# Patient Record
Sex: MALE | Race: WHITE | NOT HISPANIC OR LATINO | Employment: OTHER | URBAN - METROPOLITAN AREA
[De-identification: names, ages, dates, MRNs, and addresses within clinical notes are randomized per-mention and may not be internally consistent; named-entity substitution may affect disease eponyms.]

---

## 2020-07-16 ENCOUNTER — TELEPHONE (OUTPATIENT)
Dept: ELECTROPHYSIOLOGY | Facility: CLINIC | Age: 75
End: 2020-07-16

## 2020-07-17 ENCOUNTER — TELEPHONE (OUTPATIENT)
Dept: ELECTROPHYSIOLOGY | Facility: CLINIC | Age: 75
End: 2020-07-17

## 2020-11-11 PROBLEM — R91.1 LUNG NODULE: Status: ACTIVE | Noted: 2020-11-11

## 2020-11-11 PROBLEM — R59.0 MEDIASTINAL ADENOPATHY: Status: ACTIVE | Noted: 2020-11-11

## 2021-01-22 PROBLEM — R59.0 MEDIASTINAL LYMPHADENOPATHY: Status: ACTIVE | Noted: 2021-01-22

## 2022-06-08 ENCOUNTER — OFFICE VISIT (OUTPATIENT)
Dept: PULMONOLOGY | Facility: CLINIC | Age: 77
End: 2022-06-08
Payer: OTHER GOVERNMENT

## 2022-06-08 ENCOUNTER — HOSPITAL ENCOUNTER (OUTPATIENT)
Dept: RADIOLOGY | Facility: HOSPITAL | Age: 77
Discharge: HOME | End: 2022-06-08
Attending: INTERNAL MEDICINE
Payer: OTHER GOVERNMENT

## 2022-06-08 VITALS
SYSTOLIC BLOOD PRESSURE: 140 MMHG | HEIGHT: 72 IN | HEART RATE: 82 BPM | OXYGEN SATURATION: 96 % | WEIGHT: 238.13 LBS | BODY MASS INDEX: 32.25 KG/M2 | DIASTOLIC BLOOD PRESSURE: 86 MMHG

## 2022-06-08 DIAGNOSIS — D86.2 SARCOIDOSIS OF LUNG WITH SARCOIDOSIS OF LYMPH NODES: ICD-10-CM

## 2022-06-08 DIAGNOSIS — R91.1 SOLITARY PULMONARY NODULE: ICD-10-CM

## 2022-06-08 DIAGNOSIS — D86.2 SARCOIDOSIS OF LUNG WITH SARCOIDOSIS OF LYMPH NODES: Primary | ICD-10-CM

## 2022-06-08 PROCEDURE — 99203 PR OFFICE/OUTPT VISIT, NEW, LEVL III, 30-44 MIN: ICD-10-PCS | Mod: S$PBB,,, | Performed by: INTERNAL MEDICINE

## 2022-06-08 PROCEDURE — 99999 PR PBB SHADOW E&M-EST. PATIENT-LVL IV: ICD-10-PCS | Mod: PBBFAC,,, | Performed by: INTERNAL MEDICINE

## 2022-06-08 PROCEDURE — 71250 CT THORAX DX C-: CPT | Mod: 26,,, | Performed by: RADIOLOGY

## 2022-06-08 PROCEDURE — 71250 CT THORAX DX C-: CPT | Mod: TC

## 2022-06-08 PROCEDURE — 71250 CT CHEST WITHOUT CONTRAST: ICD-10-PCS | Mod: 26,,, | Performed by: RADIOLOGY

## 2022-06-08 PROCEDURE — 99203 OFFICE O/P NEW LOW 30 MIN: CPT | Mod: S$PBB,,, | Performed by: INTERNAL MEDICINE

## 2022-06-08 PROCEDURE — 99999 PR PBB SHADOW E&M-EST. PATIENT-LVL IV: CPT | Mod: PBBFAC,,, | Performed by: INTERNAL MEDICINE

## 2022-06-08 PROCEDURE — 99214 OFFICE O/P EST MOD 30 MIN: CPT | Mod: PBBFAC,25 | Performed by: INTERNAL MEDICINE

## 2022-06-08 RX ORDER — HYDROCHLOROTHIAZIDE 25 MG/1
12.5 TABLET ORAL
COMMUNITY
Start: 2022-04-25

## 2022-06-08 NOTE — PROGRESS NOTES
Subjective:       Patient ID: Adele Cleveland is a 76 y.o. male.  Consult from Dr. Domingo Ramos MD for robotic bronchoscopy      Chief Complaint: Pulmonary Nodules    76 year old with a history of an arrhythmia has a history of abnormal CT and underwent bronchoscopy with EBUS and mediastinoscopy that confirmed sarcoid.  In addition to mediastinal adenopathy, he has two pulmonary nodules mass that are predominant with additional subcentimeter nodules.  He is a lifelong nonsmoker who is a [employer] [place], [employer].  Does not recall agent orange exposure as he was enlisted early in the War.  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  He has no history of respiratory illness or symptoms.  Denies chronic cough or sputum production.  No shortness of breath or chest pain.    CT findings were incidentally found on cardiac CT when dirk developed a-fib with RVR        Review of Systems   Constitutional: Negative for fever, weight loss, weight gain and night sweats.   HENT: Negative for trouble swallowing.    Eyes: Negative for redness and itching.   Respiratory: Negative for hemoptysis.    Musculoskeletal: Negative for arthralgias and joint swelling.   Skin: Negative for rash.   Neurological: Negative for headaches.   Hematological: Negative for adenopathy.   Psychiatric/Behavioral: Negative for confusion.   All other systems reviewed and are negative.      Objective:       Vitals:    06/08/22 1434   BP: (!) 140/86   BP Location: Right arm   Patient Position: Sitting   Pulse: 82   SpO2: 96%   Weight: 108 kg (238 lb 1.6 oz)   Height: 6' (1.829 m)     Physical Exam   Constitutional: He is oriented to person, place, and time. He appears well-developed and well-nourished. No distress.   HENT:   Head: Normocephalic.   Right Ear: External ear normal.   Left Ear: External ear normal.   Nose: Nose normal.   Mouth/Throat: Oropharynx is clear and moist.   Neck: No tracheal deviation present. No  thyromegaly present.   Cardiovascular: Normal rate, regular rhythm, normal heart sounds and intact distal pulses.   Pulmonary/Chest: Normal expansion and symmetric chest wall expansion. He has no wheezes. He has no rales. He exhibits no tenderness.   Abdominal: Soft. Bowel sounds are normal. He exhibits no distension and no mass. There is no hepatosplenomegaly. There is no abdominal tenderness.   Musculoskeletal:         General: Normal range of motion.      Cervical back: Normal range of motion and neck supple.   Lymphadenopathy: No supraclavicular adenopathy is present.     He has no cervical adenopathy.   Neurological: He is alert and oriented to person, place, and time. No cranial nerve deficit.   Psychiatric: He has a normal mood and affect.   Vitals reviewed.       Personal Diagnostic Review    Path reports noncaseating granuloma.  Cultures and infectious stains are negative.     Post clinic CT of chest personally reviewed and compared. Agree with radiology report:    Mildly decreased size of right upper lobe lobulated lesion measuring 4.2 x 2.6 cm (axial series 4, image 134), previously 4.8 x 2.6 cm on CT chest from 03/30/2022 with hypermetabolic activity on PET CT from 11/04/2020.  Mildly decreased size of left lower lobe lobulated lesion measuring 4.5 x 3.4 cm (axial series 4, image 306), previously 4.8 x 3.4 cm on CT chest from 03/30/2022 without significant metabolic activity on PET-CT 04/20/2020.     Multiple bilateral pulmonary nodules.  Difficult to compare given multiplicity of nodules, although, overall similar distribution and size when compared to prior CT from 03/30/2022.  No definite new pulmonary nodules.    All findings have been present and reported to be similar size since 8/2020 when [facility] reported these findings 8/2020 when the RUL was described to 4.7 x 3.3 cm and the LLL was 3 x 3 in.8.  Of note, the LLL is without hypermetabolic activity.     No flowsheet data found.      Assessment:        1. Sarcoidosis of lung with sarcoidosis of lymph nodes    2. Solitary pulmonary nodule        Outpatient Encounter Medications as of 6/8/2022   Medication Sig Dispense Refill    amLODIPine (NORVASC) 5 MG tablet Take 5 mg by mouth once daily.      aspirin (ECOTRIN) 81 MG EC tablet Take 81 mg by mouth once daily.      hydroCHLOROthiazide (HYDRODIURIL) 25 MG tablet Take 12.5 mg by mouth.      metoprolol tartrate (LOPRESSOR) 50 MG tablet Take 25 mg by mouth 2 (two) times daily.      multivitamin (THERAGRAN) per tablet Take 1 tablet by mouth once daily.      rosuvastatin (CRESTOR) 40 MG Tab Take 10 mg by mouth every evening.       Facility-Administered Encounter Medications as of 6/8/2022   Medication Dose Route Frequency Provider Last Rate Last Admin    mupirocin 2 % ointment   [facility] On Call Procedure Aamir Blum MD   Given at 01/22/21 0552     Orders Placed This Encounter   Procedures    CT Chest Without Contrast     Standing Status:   Future     Number of Occurrences:   1     Standing Expiration Date:   6/8/2023     Scheduling Instructions:      Chest Navigational Bronchoscopy     Order Specific Question:   May the Radiologist modify the order per protocol to meet the clinical needs of the patient?     Answer:   Yes     Plan:     Problem List Items Addressed This Visit     Sarcoidosis of lung with sarcoidosis of lymph nodes - Primary    Overview     All findings have been present and reported to be similar size since 8/2020 when [facility] reported these findings 8/2020 when the RUL was described to 4.7 x 3.3 cm and the LLL was 3 x 3 in.8.  Of note, the LLL is without hypermetabolic activity.     Most recent CT is smaller than in March     All findings are most c/w an inflammatory process with mediastinoscopy confirming sarcoid.     Will review with [facility]>           Relevant Orders    CT Chest Without Contrast (Completed)      Other Visit Diagnoses     Solitary pulmonary nodule            Will review with  MDC to confirm that biopsy of RUL stable mass is not necessary and multifocal disease is c/w sarcoid.    Discussed plan with Dr. Domingo Ramos who agrees with plan

## 2022-06-10 PROBLEM — D86.2 SARCOIDOSIS OF LUNG WITH SARCOIDOSIS OF LYMPH NODES: Status: ACTIVE | Noted: 2022-06-10

## 2022-06-15 ENCOUNTER — DOCUMENTATION ONLY (OUTPATIENT)
Dept: CARDIOTHORACIC SURGERY | Facility: CLINIC | Age: 77
End: 2022-06-15
Payer: OTHER GOVERNMENT

## 2022-06-15 NOTE — PATIENT CARE CONFERENCE
[facility]      THORACIC MULTIDISCIPLINARY TUMOR BOARD  PATIENT REVIEW FORM  ________________________________________________________________________    CLINIC #: [id number]  DATE: 06/15/2022    DIAGNOSIS:   Lung mass    PRESENTER:   Dr. Ramos    PATIENT SUMMARY:   76 year old with a history of an arrhythmia has a history of abnormal CT and underwent bronchoscopy with EBUS and mediastinoscopy that confirmed sarcoid Nov 2020.  In addition to mediastinal adenopathy, he has two pulmonary nodules mass that are predominant with additional subcentimeter nodules.  He is a lifelong nonsmoker who is a [place] , [employer].  Does not recall agent orange exposure as he was enlisted early in the War.  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      All findings have been present and reported to be similar size since 8/2020 when [facility] reported these findings 8/2020 when the RUL was described to 4.7 x 3.3 cm and the LLL was 3 x 3 in.8.  Of note, the LLL is without hypermetabolic activity.       BOARD RECOMMENDATIONS:   RUL mass slowly increasing in density. While findings are likely related to sarcoidosis, can offer biopsy with for definitive diagnosis. Alternatively if patient declines biopsy continue to follow with interval CTs.     CONSULT NEEDED:     [] Surgery    [] Hem/Onc    [] Rad/Onc    [] Dietary                 [] Social Service    [] Psychology       [] Pulmonology    Clinical Stage: Tumor  Node(s)  Metastasis   Pathologic Stage: Tumor  Node(s)  Metastasis     GROUP STAGE:     [] O    [] 1A    [] IB    [] IIA    [] IIB     [] IIIA     [] IIIB     [] IIIC    [] IV                               [] Local recurrence     [] Regional recurrence     [] Distant recurrence                   [] NSCLC     [] SCLC     Tumor type     Unstageable:      [x] Yes     [] No  Metastatic site(s):          [] Sommer'l Treatment Guidelines reviewed and care planned is consistent with guidelines.          (i.e., NCCN, NCI, PD, ACO, AUA, etc.)    PRESENTATION AT CANCER CONFERENCE:         [] Prospective    [] Retrospective     [] Follow-Up          [] Eligible for clinical trial

## 2023-10-12 PROBLEM — M54.16 LUMBAR RADICULOPATHY: Status: ACTIVE | Noted: 2023-10-12

## 2023-11-02 PROBLEM — I10 HTN (HYPERTENSION): Status: ACTIVE | Noted: 2020-08-11
